# Patient Record
Sex: FEMALE | Race: WHITE
[De-identification: names, ages, dates, MRNs, and addresses within clinical notes are randomized per-mention and may not be internally consistent; named-entity substitution may affect disease eponyms.]

---

## 2017-07-12 ENCOUNTER — HOSPITAL ENCOUNTER (OUTPATIENT)
Dept: HOSPITAL 47 - ORWHC2ENDO | Age: 47
Discharge: HOME | End: 2017-07-12
Payer: COMMERCIAL

## 2017-07-12 VITALS — DIASTOLIC BLOOD PRESSURE: 88 MMHG | HEART RATE: 65 BPM | SYSTOLIC BLOOD PRESSURE: 131 MMHG

## 2017-07-12 VITALS — RESPIRATION RATE: 16 BRPM

## 2017-07-12 VITALS — TEMPERATURE: 98.7 F

## 2017-07-12 VITALS — BODY MASS INDEX: 20.6 KG/M2

## 2017-07-12 DIAGNOSIS — K64.2: ICD-10-CM

## 2017-07-12 DIAGNOSIS — K44.9: Primary | ICD-10-CM

## 2017-07-12 DIAGNOSIS — I10: ICD-10-CM

## 2017-07-12 DIAGNOSIS — F17.200: ICD-10-CM

## 2017-07-12 DIAGNOSIS — K64.8: ICD-10-CM

## 2017-07-12 DIAGNOSIS — I73.00: ICD-10-CM

## 2017-07-12 DIAGNOSIS — K21.0: ICD-10-CM

## 2017-07-12 DIAGNOSIS — K29.50: ICD-10-CM

## 2017-07-12 DIAGNOSIS — Z79.899: ICD-10-CM

## 2017-07-12 DIAGNOSIS — K63.5: ICD-10-CM

## 2017-07-12 DIAGNOSIS — B37.81: ICD-10-CM

## 2017-07-12 DIAGNOSIS — F41.9: ICD-10-CM

## 2017-07-12 DIAGNOSIS — J44.9: ICD-10-CM

## 2017-07-12 DIAGNOSIS — Z80.0: ICD-10-CM

## 2017-07-12 DIAGNOSIS — K57.30: ICD-10-CM

## 2017-07-12 DIAGNOSIS — F32.9: ICD-10-CM

## 2017-07-12 DIAGNOSIS — K22.10: ICD-10-CM

## 2017-07-12 DIAGNOSIS — Z79.51: ICD-10-CM

## 2017-07-12 PROCEDURE — 88342 IMHCHEM/IMCYTCHM 1ST ANTB: CPT

## 2017-07-12 PROCEDURE — 45380 COLONOSCOPY AND BIOPSY: CPT

## 2017-07-12 PROCEDURE — 43239 EGD BIOPSY SINGLE/MULTIPLE: CPT

## 2017-07-12 PROCEDURE — 88305 TISSUE EXAM BY PATHOLOGIST: CPT

## 2017-07-12 PROCEDURE — 45385 COLONOSCOPY W/LESION REMOVAL: CPT

## 2017-07-12 NOTE — P.PCN
Date of Procedure: 07/12/17


Preoperative Diagnosis: 





Postoperative Diagnosis: 





Procedure(s) Performed: 





Implants: 





Indications for Procedure: 





Operative Findings: 





Description of Procedure: 











PREOPERATIVE DIAGNOSIS:


Colonoscopy screening, initial.





POSTOPERATIVE DIAGNOSIS:


Colonoscopy screening, initial.


Diverticulosis, sigmoid colon.


Tubular adenomas.


External hemorrhoids.





OPERATION:


Colonoscopy to the ileocecal valve and appendiceal orifice.


Colonoscopy with cold forceps biopsies.


Colonoscopy with snare polypectomy.





SURGEON: Genia Garcia MD.





ANESTHESIA: MAC.





INDICATIONS:


The patient is a 46-year-old female who presents for colonoscopy screening.  

Benefits and risks were described and informed consent was obtained.





DESCRIPTION OF PROCEDURE:


The patient had undergone Gatorade, MiraLAX and Dulcolax prep. She had been 

brought into the operating room and laid in the left lateral decubitus 

position. After adequate intravenous sedation, the rectum was examined with 2% 

lidocaine jelly. External hemorrhoids were encountered. The rectal tone was 

within normal limits. No lesions were palpated in the rectal vault. An Olympus 

colonoscope was advanced until the ileocecal valve and appendiceal orifice were 

clearly viewed.  The prep was excellent with clear visualization of the mucosal 

folds.   The scope was removed with visualization of each mucosal fold.  

Scattered diverticulosis was encountered.  Tubular adenomas were found between 3

-4 mm treated with cold forceps biopsy as well as snare polypectomy at 30 cm 

and proximal including mid transverse colon. No evidence of focal colitis was 

found. Grade 3 internal hemorrhoids without active bleeding or inflammation 

were encountered. The colon was desufflated. The patient had tolerated the 

procedure well. 





Withdrawal time was over 6 minutes.





FINDINGS:


Internal hemorrhoids, grade 2.


External prolapsed hemorrhoids.


No arteriovenous malformations.


Tubular adenomas were found between 3-4 mm treated with cold forceps biopsy as 

well as snare polypectomy at 30 cm and proximal including mid transverse colon.


No focal colitis.





RECOMMENDATIONS:


Lower endoscopy in 3 years (2020) per screening guidelines. 





Plan - Discharge Summary


New Discharge Prescriptions: 


New


   Fluconazole 200 mg PO DAILY #14 tab





No Action


   traZODone  mg PO HS


   traMADol HCL [Ultram] 50 mg PO Q12HR PRN


     PRN Reason: Pain


   Cetirizine HCl [Zyrtec] 10 mg PO DAILY


   Omeprazole [PriLOSEC] 40 mg PO DAILY


   Albuterol Nebulized [Ventolin Nebulized] 2.5 mg INHALATION TID PRN


     PRN Reason: sob


   Citalopram Hydrobromide [CeleXA] 20 mg PO BID


   amLODIPine [Norvasc] 5 mg PO DAILY


   Naproxen [Naprosyn] 500 mg PO Q12HR PRN


     PRN Reason: Pain


   Tiotropium Bromide [Spiriva] 1 cap INHALATION DAILY


   Salmeterol Xinafoate [Serevent Diskus] 50 mcg IH BID MDD sob


   Cholecalciferol [Vitamin D3] 1,000 unit PO DAILY


   Beclomethasone Dipropionate [Qvar 80 mcg] 1 puff INHALATION BID


   Albuterol Inhaler [Ventolin Hfa Inhaler] 2 puff INHALATION Q6HR PRN


     PRN Reason: sob


   Potassium(Dose Unknown) 1 tab PO DAILY


   Nicotine Tube 4 mg IH AS DIRECTED PRN


     PRN Reason: Nicotine Cravings


   ALPRAZolam [Xanax] 0.5 mg PO BID PRN


     PRN Reason: Anxiety


Discharge Medication List





ALPRAZolam [Xanax] 0.5 mg PO BID PRN 07/10/17 [History]


Albuterol Inhaler [Ventolin Hfa Inhaler] 2 puff INHALATION Q6HR PRN 07/10/17 [

History]


Albuterol Nebulized [Ventolin Nebulized] 2.5 mg INHALATION TID PRN 07/10/17 [

History]


Beclomethasone Dipropionate [Qvar 80 mcg] 1 puff INHALATION BID 07/10/17 [

History]


Cetirizine HCl [Zyrtec] 10 mg PO DAILY 07/10/17 [History]


Cholecalciferol [Vitamin D3] 1,000 unit PO DAILY 07/10/17 [History]


Citalopram Hydrobromide [CeleXA] 20 mg PO BID 07/10/17 [History]


Naproxen [Naprosyn] 500 mg PO Q12HR PRN 07/10/17 [History]


Nicotine Tube 4 mg IH AS DIRECTED PRN 07/10/17 [History]


Omeprazole [PriLOSEC] 40 mg PO DAILY 07/10/17 [History]


Potassium(Dose Unknown) 1 tab PO DAILY 07/10/17 [History]


Salmeterol Xinafoate [Serevent Diskus] 50 mcg IH BID MDD sob 07/10/17 [History]


Tiotropium Bromide [Spiriva] 1 cap INHALATION DAILY 07/10/17 [History]


amLODIPine [Norvasc] 5 mg PO DAILY 07/10/17 [History]


traMADol HCL [Ultram] 50 mg PO Q12HR PRN 07/10/17 [History]


traZODone  mg PO HS 07/10/17 [History]


Fluconazole 200 mg PO DAILY #14 tab 07/12/17 [Rx]








Follow up Appointment(s)/Referral(s): 


Genia Garcia MD [STAFF PHYSICIAN] - 08/01/17


Patient Instructions/Handouts:  Colorectal Polyps (GEN), Diverticulosis Diet (

GEN), Diverticulosis (GEN), Hemorrhoids (GEN)


Activity/Diet/Wound Care/Special Instructions: 


Repeat colonoscopy 3 years, 2020.


Discharge Disposition: HOME SELF-CARE

## 2017-07-12 NOTE — P.PCN
Date of Procedure: 07/12/17


Preoperative Diagnosis: 





Postoperative Diagnosis: 





Procedure(s) Performed: 





Implants: 





Indications for Procedure: 





Operative Findings: 





Description of Procedure: 





PREOPERATIVE DIAGNOSIS:


Gastroesophageal reflux disease.


Epigastric abdominal pain.





POSTOPERATIVE DIAGNOSIS:


Esophagitis.


Gastritis.


Gastroesophageal reflux disease.


Epigastric abdominal pain.


Diaphragmatic hiatal hernia without obstruction.





OPERATION:


Esophagogastroduodenoscopy with biopsies along antrum and esophagus.





SURGEON: Genia Garcia MD





ANESTHESIA: MAC.





INDICATIONS:


The patient is a 46-year-old female who presents with a history of reflux 

disease and epigastric abdominal pain. Benefits and risks of the procedure were 

described. Informed consent was obtained.





DESCRIPTION:


The patient was brought into the endoscopy suite and laid in the left lateral 

decubitus position. An Olympus gastroscope was passed along the posterior 

oropharynx down to the distal esophagus where the squamocolumnar junction was 

encountered at 41 cm from the incisors. The stomach was entered and no bile 

reflux was found.  Additional findings are listed below. Biopsies with cold 

forceps were obtained of the antrum. The first through third portion of the 

duodenum was examined and unremarkable. Retroflexion of the scope confirmed  

Hill grade 3 lower esophageal valve. The squamocolumnar junction LA grade B 

erosive esophagitis.  Moderate yeast was found throughout the entire length of 

the esophagus.  The stomach was desufflated. The patient tolerated the 

procedure well.





FINDINGS:


Squamocolumnar junction 41 cm from the incisors.


Diaphragmatic hiatus at 41 cm.


Hill grade 3 lower esophageal valve.


LA grade B erosive esophagitis.


No active duodenitis.


Yeast esophagitis.


Chronic gastritis.





RECOMMENDATIONS:


Further recommendations pending results of pathology report.


Upper endoscopy as needed.

## 2017-07-12 NOTE — P.GSHP
History of Present Illness


H&P Date: 07/12/17





CHIEF COMPLAINT:


History of gas bloat and hiatal hernia. 





HISTORY OF PRESENT ILLNESS:


Zakia Schulte is a 46 year-old female who was last seen in 2015. She was 

actually set to get an upper and lower endoscopy, however she chickened out. 

She has a strong family history of colon cancer. She also reports gas bloat and 

constipation. She is actively smoking; however, she reports cutting down in 

moderate amounts. She also has Raynaud's in the fingertips as well secondary to 

poor circulation. 





ROS


Additionally reports: GI: Gastroesophageal reflux disease. Reports chronic 

constipation. 





CONSTITUTIONAL: No fevers or chills. Weight unchanged. 


HEENT: Denies any trouble with vision, hearing or nosebleeds. No difficulty 

swallowing. 


LYMPHATIC: The patient denies any lumps and bumps around the neck. 


ENDOCRINE: Denies any thyroid disorders. Denies any blood sugar glucose 

intolerance.


RESPIRATORY: Denies pneumonia. Has troubles with breathing or dyspnea on 

exertion. Asthma.


CARDIOVASCULAR: Denies any chest pain, palpitations, or recent heart attacks. 


GENITOURINARY: Denies any blood in urine or increased urinary frequency. 


MUSCULOSKELETAL: Denies any back pain, stiffness or joint arthritis. 


NEUROLOGIC: Denies any numbness or tingling along the distal extremities. No 

seizure disorders or headaches.


PSYCHIATRIC: Denies any depression or suicidal ideation.


HEMATOLOGIC: Denies any abnormal bleeding or bruising. 


BREASTS: Denies any breast lumps, pain or nipple discharge.





Physical Exam


Patient is a 46-year-old female.





GENERAL: Well developed and in no acute distress. Pleasant.


HEENT: No sclera icterus. Extraocular movements grossly intact. Moist buccal 

mucosa. Head is atraumatic, normocephalic. Hears conversational speech. No 

nasal drainage.


NECK: Supple without lymphadenopathy. No JV distention.


CHEST: Non-labored respirations and equal bilateral excursions. 


CARDIOVASCULAR: Regular rate and rhythm. Palpable 2+ radial pulses.


ABDOMEN: Soft, distended. Nontender.


MUSCULOSKELETAL: No clubbing, cyanosis or edema.


NEUROLOGIC: No focal or lateralizing signs. 


PSYCH: Appropriate affect. Alert and oriented to person, place and time.





Assessment / Plan


ASSESSMENT:


1. History of gas bloat. 


2. Hiatal hernia. 


3. Gastroesophageal reflux disease. 


4. Family history of colon cancer. 





PLAN:


1. I have recommended proceeding with an upper and lower endoscopy as she 

reports hiatal hernia and has family history of colon cancer, and changes in 

bowel habits. 


2. I did go over tobacco cessation and counseling, as she has poor circulation 

with Raynaud's. Her increased inhalation will also increase her gas bloat as 

well. 


3. I have recommended follow up upon completion of her studies. 








Past Medical History


Past Medical History: COPD, GERD/Reflux, Hypertension


Additional Past Medical History / Comment(s): "chronic pollution disease", 

hiatal hernia, raynauds syndrome,


History of Any Multi-Drug Resistant Organisms: None Reported


Past Surgical History: Orthopedic Surgery, Tubal Ligation


Additional Past Surgical History / Comment(s): floridalma and screws left leg


Past Anesthesia/Blood Transfusion Reactions: No Reported Reaction


Additional Past Anesthesia/Blood Transfusion Reaction / Comment(s): 

claustrophobia


Smoking Status: Current every day smoker





- Past Family History


  ** Father


Family Medical History: Cancer





Medications and Allergies


 Home Medications











 Medication  Instructions  Recorded  Confirmed  Type


 


ALPRAZolam [Xanax] 0.5 mg PO BID PRN 07/10/17 07/10/17 History


 


Albuterol Inhaler [Ventolin Hfa 2 puff INHALATION Q6HR PRN 07/10/17 07/10/17 

History





Inhaler]    


 


Albuterol Nebulized [Ventolin 2.5 mg INHALATION TID PRN 07/10/17 07/10/17 

History





Nebulized]    


 


Beclomethasone Dipropionate [Qvar 1 puff INHALATION BID 07/10/17 07/10/17 

History





80 mcg]    


 


Cetirizine HCl [Zyrtec] 10 mg PO DAILY 07/10/17 07/10/17 History


 


Cholecalciferol [Vitamin D3] 1,000 unit PO DAILY 07/10/17 07/10/17 History


 


Citalopram Hydrobromide [CeleXA] 20 mg PO BID 07/10/17 07/10/17 History


 


Naproxen [Naprosyn] 500 mg PO Q12HR PRN 07/10/17 07/10/17 History


 


Nicotine Tube 4 mg IH AS DIRECTED PRN 07/10/17 07/10/17 History


 


Omeprazole [PriLOSEC] 40 mg PO DAILY 07/10/17 07/10/17 History


 


Potassium(Dose Unknown) 1 tab PO DAILY 07/10/17 07/10/17 History


 


Salmeterol Xinafoate [Serevent 50 mcg IH BID MDD sob 07/10/17 07/10/17 History





Diskus]    


 


Tiotropium Bromide [Spiriva] 1 cap INHALATION DAILY 07/10/17 07/10/17 History


 


amLODIPine [Norvasc] 5 mg PO DAILY 07/10/17 07/10/17 History


 


traMADol HCL [Ultram] 50 mg PO Q12HR PRN 07/10/17 07/10/17 History


 


traZODone  mg PO HS 07/10/17 07/10/17 History











 Allergies











Allergy/AdvReac Type Severity Reaction Status Date / Time


 


No Known Allergies Allergy   Verified 07/10/17 13:54

## 2018-07-18 ENCOUNTER — HOSPITAL ENCOUNTER (INPATIENT)
Dept: HOSPITAL 47 - EC | Age: 48
LOS: 2 days | Discharge: HOME | DRG: 246 | End: 2018-07-20
Attending: HOSPITALIST | Admitting: HOSPITALIST
Payer: COMMERCIAL

## 2018-07-18 VITALS — BODY MASS INDEX: 21.2 KG/M2

## 2018-07-18 DIAGNOSIS — Z87.81: ICD-10-CM

## 2018-07-18 DIAGNOSIS — F40.240: ICD-10-CM

## 2018-07-18 DIAGNOSIS — Z79.02: ICD-10-CM

## 2018-07-18 DIAGNOSIS — I21.4: Primary | ICD-10-CM

## 2018-07-18 DIAGNOSIS — I11.0: ICD-10-CM

## 2018-07-18 DIAGNOSIS — Z79.51: ICD-10-CM

## 2018-07-18 DIAGNOSIS — K21.9: ICD-10-CM

## 2018-07-18 DIAGNOSIS — I50.21: ICD-10-CM

## 2018-07-18 DIAGNOSIS — J44.9: ICD-10-CM

## 2018-07-18 DIAGNOSIS — F17.200: ICD-10-CM

## 2018-07-18 DIAGNOSIS — F41.0: ICD-10-CM

## 2018-07-18 DIAGNOSIS — Z79.899: ICD-10-CM

## 2018-07-18 DIAGNOSIS — Z71.6: ICD-10-CM

## 2018-07-18 DIAGNOSIS — I25.5: ICD-10-CM

## 2018-07-18 DIAGNOSIS — Z95.5: ICD-10-CM

## 2018-07-18 DIAGNOSIS — E87.1: ICD-10-CM

## 2018-07-18 DIAGNOSIS — I08.1: ICD-10-CM

## 2018-07-18 DIAGNOSIS — I73.00: ICD-10-CM

## 2018-07-18 DIAGNOSIS — I95.9: ICD-10-CM

## 2018-07-18 DIAGNOSIS — Z79.82: ICD-10-CM

## 2018-07-18 LAB
ALBUMIN SERPL-MCNC: 4.9 G/DL (ref 3.5–5)
ALP SERPL-CCNC: 104 U/L (ref 38–126)
ALT SERPL-CCNC: 51 U/L (ref 9–52)
AMYLASE SERPL-CCNC: 51 U/L (ref 30–110)
ANION GAP SERPL CALC-SCNC: 15 MMOL/L
APTT BLD: 23.9 SEC (ref 22–30)
AST SERPL-CCNC: 65 U/L (ref 14–36)
BASOPHILS # BLD AUTO: 0 K/UL (ref 0–0.2)
BASOPHILS NFR BLD AUTO: 0 %
BUN SERPL-SCNC: 9 MG/DL (ref 7–17)
CALCIUM SPEC-MCNC: 10.4 MG/DL (ref 8.4–10.2)
CHLORIDE SERPL-SCNC: 87 MMOL/L (ref 98–107)
CK SERPL-CCNC: 249 U/L (ref 30–135)
CK SERPL-CCNC: 259 U/L (ref 30–135)
CK SERPL-CCNC: 277 U/L (ref 30–135)
CO2 SERPL-SCNC: 24 MMOL/L (ref 22–30)
EOSINOPHIL # BLD AUTO: 0.1 K/UL (ref 0–0.7)
EOSINOPHIL NFR BLD AUTO: 1 %
ERYTHROCYTE [DISTWIDTH] IN BLOOD BY AUTOMATED COUNT: 5.77 M/UL (ref 3.8–5.4)
ERYTHROCYTE [DISTWIDTH] IN BLOOD: 12.6 % (ref 11.5–15.5)
GLUCOSE SERPL-MCNC: 114 MG/DL (ref 74–99)
HCT VFR BLD AUTO: 54.3 % (ref 34–46)
HGB BLD-MCNC: 18.5 GM/DL (ref 11.4–16)
HYALINE CASTS UR QL AUTO: 1 /LPF (ref 0–2)
INR PPP: 1.1 (ref ?–1.2)
LIPASE SERPL-CCNC: 73 U/L (ref 23–300)
LYMPHOCYTES # SPEC AUTO: 1.2 K/UL (ref 1–4.8)
LYMPHOCYTES NFR SPEC AUTO: 12 %
MCH RBC QN AUTO: 32 PG (ref 25–35)
MCHC RBC AUTO-ENTMCNC: 34 G/DL (ref 31–37)
MCV RBC AUTO: 94.1 FL (ref 80–100)
MONOCYTES # BLD AUTO: 0.7 K/UL (ref 0–1)
MONOCYTES NFR BLD AUTO: 6 %
NEUTROPHILS # BLD AUTO: 8.5 K/UL (ref 1.3–7.7)
NEUTROPHILS NFR BLD AUTO: 80 %
PH UR: 7.5 [PH] (ref 5–8)
PLATELET # BLD AUTO: 243 K/UL (ref 150–450)
POTASSIUM SERPL-SCNC: 4.5 MMOL/L (ref 3.5–5.1)
PROT SERPL-MCNC: 8.1 G/DL (ref 6.3–8.2)
PROT UR QL: (no result)
PT BLD: 10.4 SEC (ref 9–12)
RBC UR QL: 1 /HPF (ref 0–5)
SODIUM SERPL-SCNC: 126 MMOL/L (ref 137–145)
SP GR UR: 1.01 (ref 1–1.03)
SQUAMOUS UR QL AUTO: 2 /HPF (ref 0–4)
TROPONIN I SERPL-MCNC: 2.31 NG/ML (ref 0–0.03)
TROPONIN I SERPL-MCNC: 3.21 NG/ML (ref 0–0.03)
TROPONIN I SERPL-MCNC: 3.39 NG/ML (ref 0–0.03)
UROBILINOGEN UR QL STRIP: 4 MG/DL (ref ?–2)
WBC # BLD AUTO: 10.6 K/UL (ref 3.8–10.6)
WBC #/AREA URNS HPF: 4 /HPF (ref 0–5)

## 2018-07-18 PROCEDURE — 74018 RADEX ABDOMEN 1 VIEW: CPT

## 2018-07-18 PROCEDURE — 96365 THER/PROPH/DIAG IV INF INIT: CPT

## 2018-07-18 PROCEDURE — 85730 THROMBOPLASTIN TIME PARTIAL: CPT

## 2018-07-18 PROCEDURE — 93005 ELECTROCARDIOGRAM TRACING: CPT

## 2018-07-18 PROCEDURE — 93306 TTE W/DOPPLER COMPLETE: CPT

## 2018-07-18 PROCEDURE — 83605 ASSAY OF LACTIC ACID: CPT

## 2018-07-18 PROCEDURE — 96368 THER/DIAG CONCURRENT INF: CPT

## 2018-07-18 PROCEDURE — 85610 PROTHROMBIN TIME: CPT

## 2018-07-18 PROCEDURE — 81001 URINALYSIS AUTO W/SCOPE: CPT

## 2018-07-18 PROCEDURE — 96375 TX/PRO/DX INJ NEW DRUG ADDON: CPT

## 2018-07-18 PROCEDURE — 4A023N7 MEASUREMENT OF CARDIAC SAMPLING AND PRESSURE, LEFT HEART, PERCUTANEOUS APPROACH: ICD-10-PCS

## 2018-07-18 PROCEDURE — 96376 TX/PRO/DX INJ SAME DRUG ADON: CPT

## 2018-07-18 PROCEDURE — 84484 ASSAY OF TROPONIN QUANT: CPT

## 2018-07-18 PROCEDURE — 96372 THER/PROPH/DIAG INJ SC/IM: CPT

## 2018-07-18 PROCEDURE — 83690 ASSAY OF LIPASE: CPT

## 2018-07-18 PROCEDURE — 85025 COMPLETE CBC W/AUTO DIFF WBC: CPT

## 2018-07-18 PROCEDURE — 82550 ASSAY OF CK (CPK): CPT

## 2018-07-18 PROCEDURE — 82150 ASSAY OF AMYLASE: CPT

## 2018-07-18 PROCEDURE — 96366 THER/PROPH/DIAG IV INF ADDON: CPT

## 2018-07-18 PROCEDURE — 82553 CREATINE MB FRACTION: CPT

## 2018-07-18 PROCEDURE — 80053 COMPREHEN METABOLIC PANEL: CPT

## 2018-07-18 PROCEDURE — 99291 CRITICAL CARE FIRST HOUR: CPT

## 2018-07-18 PROCEDURE — B2151ZZ FLUOROSCOPY OF LEFT HEART USING LOW OSMOLAR CONTRAST: ICD-10-PCS

## 2018-07-18 PROCEDURE — 027036Z DILATION OF CORONARY ARTERY, ONE ARTERY WITH THREE DRUG-ELUTING INTRALUMINAL DEVICES, PERCUTANEOUS APPROACH: ICD-10-PCS

## 2018-07-18 PROCEDURE — B2111ZZ FLUOROSCOPY OF MULTIPLE CORONARY ARTERIES USING LOW OSMOLAR CONTRAST: ICD-10-PCS

## 2018-07-18 PROCEDURE — 71046 X-RAY EXAM CHEST 2 VIEWS: CPT

## 2018-07-18 PROCEDURE — 93458 L HRT ARTERY/VENTRICLE ANGIO: CPT

## 2018-07-18 PROCEDURE — 96361 HYDRATE IV INFUSION ADD-ON: CPT

## 2018-07-18 PROCEDURE — 36415 COLL VENOUS BLD VENIPUNCTURE: CPT

## 2018-07-18 RX ADMIN — NITROGLYCERIN ONE MCG: 10 INJECTION INTRAVENOUS at 18:17

## 2018-07-18 RX ADMIN — CEFAZOLIN SCH MLS: 330 INJECTION, POWDER, FOR SOLUTION INTRAMUSCULAR; INTRAVENOUS at 17:20

## 2018-07-18 RX ADMIN — NITROGLYCERIN ONE MCG: 10 INJECTION INTRAVENOUS at 17:58

## 2018-07-18 RX ADMIN — METOPROLOL TARTRATE SCH MG: 25 TABLET, FILM COATED ORAL at 21:52

## 2018-07-18 RX ADMIN — CEFAZOLIN SCH MLS/HR: 330 INJECTION, POWDER, FOR SOLUTION INTRAMUSCULAR; INTRAVENOUS at 16:03

## 2018-07-18 RX ADMIN — ATORVASTATIN CALCIUM SCH MG: 80 TABLET, FILM COATED ORAL at 21:52

## 2018-07-18 RX ADMIN — NITROGLYCERIN ONE MCG: 10 INJECTION INTRAVENOUS at 18:05

## 2018-07-18 NOTE — XR
EXAMINATION TYPE: XR chest 2V

 

DATE OF EXAM: 7/18/2018

 

COMPARISON: NONE

 

HISTORY: Chest pain

 

TECHNIQUE:  Frontal and lateral views of the chest are obtained.

 

FINDINGS:  

 

There is no focal air space opacity.

 

No evidence for pneumothorax.  No pleural effusion.

 

The cardiac silhouette size is within normal limits.

 

The osseous structures are grossly intact.

 

IMPRESSION:  

 

1.  No acute cardiopulmonary process.

## 2018-07-18 NOTE — XR
EXAMINATION TYPE: XR KUB

 

DATE OF EXAM: 7/18/2018

 

COMPARISON: NONE

 

HISTORY: Pain

 

TECHNIQUE: Single supine KUB image of the abdomen is obtained

 

FINDINGS:  

Small bowel demonstrates no evidence for dilatation or air fluid levels.  

 

Gas and fecal material is seen in non-distended colon.  

 

No convincing evidence for pneumoperitoneum.

 

 No unusual calcifications. 

 

The lung bases are clear. 

 

The osseous structures are intact.

 

IMPRESSION:  

 

1.  Overall nonobstructive bowel gas pattern.

## 2018-07-18 NOTE — CONS
CONSULTATION



DATE OF SERVICE:

2018



Reason for the consultation is epigastric discomfort.



HISTORY OF PRESENT ILLNESS:

This is a pleasant 47-year-old  female patient with a past medical history

significant for hypertension, as well as chronic obstructive pulmonary disease who

presented to the emergency room earlier today complaining of epigastric discomfort.

The patient was in her usual state of health until yesterday when she started

experiencing discomfort mainly in the epigastric area with some radiation to the back.

The discomfort was a sharp kind of discomfort.  No associated symptoms of shortness of

breath, but she did have nausea associated with discomfort.  Yesterday she did not seek

any medical attention, but the discomfort today got worse and she decided to come to

the emergency room.  In the emergency room, the patient did have a cardiac workup.  The

EKG showed sinus rhythm with nonspecific changes in the inferior leads and T-wave

inversion in the inferolateral leads, as well as Q-wave inferiorly.  The cardiac

enzymes were checked and came in to be abnormal.  The patient was ruled out for acute

non-ST elevation myocardial infarction.  When the patient was seen in the emergency

room, she was complaining of very mild chest discomfort and she was on heparin IV.

Because of that, I did recommend proceeding with a heart catheterization.  The patient

did undergo a heart catheterization and that revealed occluded first obtuse marginal

branch of the left circumflex, which was working as ramus intermedius from the ostium.

She underwent successful stenting of the first obtuse marginal branch of the left

circumflex using three drug-eluting stent with good angiographic results and without

any complication from a procedure was performed from the right groin.  By the end of

the procedure, the patient was completely pain-free.



PAST MEDICAL HISTORY:

Includes:

1. Hypertension.

2. Chronic obstructive pulmonary disease.



PAST SURGICAL HISTORY:

There is no major cardiovascular surgery in the past.  She did undergo tubal ligation

in the past.



SOCIAL HISTORY:

The patient does smoke about 1 pack of cigarette every day for more than 20 years.  She

does not drink alcohol.



FAMILY HISTORY:

Family history is not relevant.



CURRENT MEDICATIONS:

At home include the followin. Bronchodilators inhalers.

2. Amlodipine 5 mg p.o. daily.

3. Omeprazole 40 mg p.o. daily.

4. Citalopram 20 mg daily.

5. Albuterol inhaler q.i.d.



PHYSICAL EXAMINATION:

GENERAL APPEARANCE:  The patient was not looking in any pain or any distress.

VITALS:  Showed temp of 97.9, heart rate 88, respiratory rate is 18 per minute, blood

pressure is 115/76 mmHg.  Cardiovascular examination shows regular rate and rhythm with

normal S1 and S2. Respiratory examination shows clear breathing sounds bilaterally.

Abdomen is soft and nontender.

EXTREMITY EXAMINATION: No pedal edema.

VASCULAR EXAMINATION:  There is no right radial pulse, but she does have good right

femoral pulse.



DIAGNOSTIC WORKUP:

The EKG was as described above.  It did show sinus rhythm with nonspecific changes

inferiorly and Q-wave inferiorly, as well as T-wave inversion in the inferolateral

leads.  WBC is 10.6, hemoglobin 18.5. Troponin is 2.310.  Sodium is 126, potassium is

4.5, BUN is 9, creatinine 0.67.



ASSESSMENT:

1. Acute coronary syndrome.

2. Status post percutaneous coronary intervention of the first obtuse marginal branch

    of the left circumflex.

3. Hypertension.

4. Significant history of smoking.



PLAN:

1. Dual anti-platelet therapy.

2. High-intensity statin.

3. Anti-ischemic medication including metoprolol.

4. An echocardiogram with Doppler to evaluate the left ventricular systolic function.

5. Smoking cessation was discussed with the patient.

6. Follow up with the patient.

Thank you for allowing us to participate in her care and we will continue following up

with her.





PARAS / NIDHIN: [id number] / Job#: [id number]
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
## 2018-07-18 NOTE — CC
CARDIAC CATHETERIZATION REPORT



DATE OF SERVICE:

07/18/2018



PERFORMING PHYSICIAN:

Last Mar MD, interventional cardiologist.



PROCEDURES PERFORMED:

1. Selective right and left coronary angiogram.

2. Left heart catheterization.

3. Successful stenting of obtuse marginal 1 of the left circumflex using proximally

    2.5 x 18 mm Xience SHWETA, in the mid portion 2.25 x 15 and 2.25 x 8 mm Xience SHWETA

    with good angiographic results and reduction of stenosis from 100% to 0%.

4.



INDICATION:

This is a pleasant 47-year-old  female patient with history of hypertension

and significant history of smoking, who presented to the emergency room complaining of

epigastric discomfort felt in the beginning to be related to reflux disease.  The

patient was ruled in for acute non-ST elevation myocardial infarction.  The decision

was made toward heart catheterization and percutaneous coronary intervention.



APPROACH:

Right common femoral artery.



COMPLICATION:

None.



LEVEL OF SEDATION:

Moderate with sedation length of 56 minutes.



PROCEDURE DESCRIPTION:

After obtaining an informed consent, the patient was brought to cardiac cath lab.  The

right common femoral artery was cannulated using micropuncture technique and a

micropuncture wire passed easily.  Then I placed a 6-Welsh sheath in the right common

femoral artery.



I did after that selective right and left coronary angiogram using JR4 and JL4

catheters.  Left heart catheterization was performed using the JR4 catheter which

flipped into the LV, then I did pullback across the aortic valve.  The diagnostic

procedure was completed at that point.



After that, I did intervene on the left circumflex.  Please see a separate paragraph

for that.



SELECTIVE CORONARY ANGIOGRAM:

1. The RCA is a large caliber vessel and it is a dominant vessel.  The RCA has mild

    disease only.  Distally bifurcates into PDA and PLV branches.  Both are

    angiographically normal.

2. The left main is angiographically normal.  It bifurcates into left circumflex and

    left anterior descending artery.

3. The left circumflex is a large caliber vessel.  It is a nondominant vessel.  The

    proximal circ gives rise into a very high takeoff 1st OM branch which is occluded

    by the ostium.  The left circumflex in the mid and distal portion appeared to have

    mild disease only.

4. The LAD:  The LAD has mild disease only.



HEMODYNAMICS:

The left ventricular end-diastolic pressure was about 12 mmHg and no gradient was

identified across aortic valve. percutaneous intervention of the left.



CIRCUMFLEX/PERCUTANEOUS INTERVENTION OF THE OBTUSE MARGINAL 1:

Anticoagulation was initiated using Angiomax.  Subsequently I did take JL4 guide and

the left main was engaged.  I did cross acute total occlusion at the ostial of OM1

using a Whisper wire and I did advance the wire all the way to the distal OM1.  I did

initially do balloon angioplasty using 1.5 mm balloon.  After that, I did wire the OM1

using another wire with a run-through wire as a noé wire to straighten the tortuosity

in the OM1.  After that, I did deploy in the ostial/proximal OM1 2.5 x 18 mm Xience

SHWETA, where the stent was positioned under fluoroscopy guidance and deployed under its

nominal pressure.



There was another lesion in the mid portion of OM1, which seems to be tight, and I

decided to cover that lesion with a stent, so I took 2.25 x 15 mm Xience SHWETA and I did

position the stent under fluoroscopy guidance and deployed under its nominal pressure.

There was an area left between the 2 stents in the proximal and mid and it was quite

concerning and I decided to cover that lesion using 8 mm stent, so I took  2.25 x 8 mm

Xience and the stent was positioned under fluoroscopy guidance and deployed under its

nominal pressure.



The final angiogram showed good angiographic results with reduction of stenosis from

100% to 0% with MELODIE-3 flow.



CONCLUSION:

1. Acute coronary syndrome.

2. Acute/subacute total occlusion of OM1 off the left circumflex.

3. Successful stenting of obtuse marginal 1 of left circumflex using 3 drug-eluting

    stent with good angiographic results and without any complication.



POSTPROCEDURE MANAGEMENT:

1. Dual anti-platelet therapy.

2. Risk factor modifications.

3. Follow up with the patient.





MMODL / IJN: [id number] / Job#: [id number]

## 2018-07-19 LAB
ALBUMIN SERPL-MCNC: 3.4 G/DL (ref 3.5–5)
ALP SERPL-CCNC: 63 U/L (ref 38–126)
ALT SERPL-CCNC: 40 U/L (ref 9–52)
ANION GAP SERPL CALC-SCNC: 6 MMOL/L
AST SERPL-CCNC: 47 U/L (ref 14–36)
BASOPHILS # BLD AUTO: 0.1 K/UL (ref 0–0.2)
BASOPHILS NFR BLD AUTO: 1 %
BUN SERPL-SCNC: 12 MG/DL (ref 7–17)
CALCIUM SPEC-MCNC: 9.1 MG/DL (ref 8.4–10.2)
CHLORIDE SERPL-SCNC: 100 MMOL/L (ref 98–107)
CO2 SERPL-SCNC: 27 MMOL/L (ref 22–30)
EOSINOPHIL # BLD AUTO: 0.1 K/UL (ref 0–0.7)
EOSINOPHIL NFR BLD AUTO: 1 %
ERYTHROCYTE [DISTWIDTH] IN BLOOD BY AUTOMATED COUNT: 4.85 M/UL (ref 3.8–5.4)
ERYTHROCYTE [DISTWIDTH] IN BLOOD: 12.6 % (ref 11.5–15.5)
GLUCOSE SERPL-MCNC: 85 MG/DL (ref 74–99)
HCT VFR BLD AUTO: 46.8 % (ref 34–46)
HGB BLD-MCNC: 15.7 GM/DL (ref 11.4–16)
LYMPHOCYTES # SPEC AUTO: 2 K/UL (ref 1–4.8)
LYMPHOCYTES NFR SPEC AUTO: 19 %
MCH RBC QN AUTO: 32.4 PG (ref 25–35)
MCHC RBC AUTO-ENTMCNC: 33.6 G/DL (ref 31–37)
MCV RBC AUTO: 96.5 FL (ref 80–100)
MONOCYTES # BLD AUTO: 0.7 K/UL (ref 0–1)
MONOCYTES NFR BLD AUTO: 7 %
NEUTROPHILS # BLD AUTO: 7.4 K/UL (ref 1.3–7.7)
NEUTROPHILS NFR BLD AUTO: 71 %
PLATELET # BLD AUTO: 198 K/UL (ref 150–450)
POTASSIUM SERPL-SCNC: 4.1 MMOL/L (ref 3.5–5.1)
PROT SERPL-MCNC: 5.9 G/DL (ref 6.3–8.2)
SODIUM SERPL-SCNC: 133 MMOL/L (ref 137–145)
WBC # BLD AUTO: 10.4 K/UL (ref 3.8–10.6)

## 2018-07-19 RX ADMIN — METOPROLOL TARTRATE SCH MG: 25 TABLET, FILM COATED ORAL at 08:45

## 2018-07-19 RX ADMIN — Medication SCH: at 06:50

## 2018-07-19 RX ADMIN — Medication SCH MG: at 16:18

## 2018-07-19 RX ADMIN — ASPIRIN 325 MG ORAL TABLET SCH MG: 325 PILL ORAL at 08:45

## 2018-07-19 RX ADMIN — Medication SCH MG: at 13:29

## 2018-07-19 RX ADMIN — METOPROLOL TARTRATE SCH MG: 25 TABLET, FILM COATED ORAL at 20:04

## 2018-07-19 RX ADMIN — ATORVASTATIN CALCIUM SCH MG: 80 TABLET, FILM COATED ORAL at 20:04

## 2018-07-19 RX ADMIN — LISINOPRIL SCH MG: 2.5 TABLET ORAL at 13:29
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
## 2018-07-19 NOTE — ECHOF
Referral Reason:nstemi



MEASUREMENTS

--------

HEIGHT: 165.1 cm

WEIGHT: 59.0 kg

BP: 85/61

IVSd:   1.3 cm     (0.6 - 1.1)

LVIDd:   3.5 cm     (3.9 - 5.3)

LVPWd:   1.2 cm     (0.6 - 1.1)

IVSs:   1.6 cm

LVIDs:   2.8 cm

LVPWs:   1.3 cm

LA Diam:   2.0 cm     (2.7 - 3.8)

Ao Diam:   3.4 cm     (2.0 - 3.7)

AV Cusp:   2.1 cm     (1.5 - 2.6)

LA Diam:   2.5 cm     (2.7 - 3.8)

MV EXCURSION:   16.095 mm     (> 18.000)

MV EF SLOPE:   63 mm/s     (70 - 150)

EPSS:   0.2 cm

MV E Orville:   0.38 m/s

MV DecT:   198 ms

MV A Orville:   0.48 m/s

MV E/A Ratio:   0.78 

RAP:   5.00 mmHg

RVSP:   12.99 mmHg







FINDINGS

--------

Sinus rhythm.

This was a techncally difficult study with suboptimal views, , Lumason utilized for enhancement of im
ages.

The left ventricular size is normal.   There is mild concentric left ventricular hypertrophy.   Overa
ll left ventricular systolic function is mild-moderately impaired with, an EF between 40 - 45 %.   In
ferior Hypokinesis   Posterior Hypokinesis.

The right ventricle is normal in size.

The left atrial size is normal.

The right atrial size is normal.

5.0mg OF Lumason UTLIZED: 2 OR MORE WALL SEGMENTS NOT VISUALIZED.

The aortic valve is trileaflet, and appears structurally normal. No aortic stenosis or regurgitation.


Mild mitral annular calcification present.   Mild mitral regurgitation is present.

Mild tricuspid regurgitation present.   There is no evidence of pulmonary hypertension.   The right v
entricular systolic pressure, as measured by Doppler, is 12.99mmHg.

There is no pulmonic regurgitation present.

The aortic root size is normal.

There is no pericardial effusion.



CONCLUSIONS

--------

1. This was a techncally difficult study with suboptimal views, , Lumason utilized for enhancement of
 images.

2. The left ventricular size is normal.

3. There is mild concentric left ventricular hypertrophy.

4. Overall left ventricular systolic function is mild-moderately impaired with, an EF between 40 - 45
 %.

5. Inferior Hypokinesis

6. Posterior Hypokinesis.

7. The right ventricle is normal in size.

8. The left atrial size is normal.

9. The right atrial size is normal.

10. 5.0mg OF Lumason UTLIZED: 2 OR MORE WALL SEGMENTS NOT VISUALIZED.

11. The aortic valve is trileaflet, and appears structurally normal. No aortic stenosis or regurgitat
ion.

12. Mild mitral annular calcification present.

13. Mild mitral regurgitation is present.

14. Mild tricuspid regurgitation present.

15. There is no evidence of pulmonary hypertension.

16. The right ventricular systolic pressure, as measured by Doppler, is 12.99mmHg.

17. There is no pulmonic regurgitation present.

18. The aortic root size is normal.

19. There is no pericardial effusion.





SONOGRAPHER: Argentina Elliott RDCS

## 2018-07-19 NOTE — P.PN
Subjective


Progress Note Date: 07/19/18


Principal diagnosis: 


NSTEMI





Summary pleasant 47-year-old  female patient with past medical history 

significant for hypertension, COPD and smoking.  Presented to the emergency 

department complaining of epigastric discomfort that started the day prior.  

EKG showed sinus rhythm with nonspecific changes in the inferior leads and T-

wave inversion in the inferolateral leads as well as Q waves inferiorly.  

Cardiac enzymes came back to be elevated with a troponin of 2.310, 3.210 and 

3.390.  She was recommended to undergo cardiac catheterization which revealed 

occluded first obtuse marginal branch of the left circumflex for which she 

underwent successful stenting of the first obtuse marginal branch of left 

circumflex using 3 drug-eluting stents.  He underwent echocardiogram with 

Doppler study which revealed mild to moderately impaired LV systolic function 

with an ejection fraction between 40-45%, inferior and posterior hypokinesis as 

well as mild mitral regurgitation and mild tricuspid regurgitation.  She is 

currently on aspirin 325 mg by mouth daily, Lipitor 80 mg by mouth daily at 

bedtime, Plavix 75 mg by mouth daily and metoprolol tartrate 25 mg by mouth 

twice a day.  Her blood pressure is running on the low side with readings of 85/

61, 106/67 and 93/68.  Her heart rate is in the 70s.  Upon examination, patient 

is resting comfortably in bed.  She denies complaints of chest or epigastric 

discomfort.  She's only been up to the bathroom but has done this without 

difficulties.  She is anxious to be discharged home.








Objective





- Vital Signs


Vital signs: 


 Vital Signs











Temp  96.6 F L  07/19/18 08:48


 


Pulse  76   07/19/18 08:49


 


Resp  16   07/19/18 08:49


 


BP  93/68   07/19/18 08:48


 


Pulse Ox  94 L  07/19/18 08:48








 Intake & Output











 07/18/18 07/19/18 07/19/18





 18:59 06:59 18:59


 


Intake Total 166 250 120


 


Output Total 600  


 


Balance -434 250 120


 


Weight 58.967 kg 57.7 kg 57.7 kg


 


Intake:   


 


    


 


  Intake, IV Titration 50 250 





  Amount   


 


    Bivalirudin 250 mg In 50  





    Sodium Chloride 0.9% 50   





    ml @ 0 mls/hr IV .[facility]   





    ONE Rx#:[id number]   


 


    Sodium Chloride 0.9% 1,  250 





    000 ml @ 100 mls/hr IV .   





    Q10H NGUYEN Rx#:[id number]   


 


  Oral   120


 


Output:   


 


  Urine 600  


 


Other:   


 


  Voiding Method   Toilet


 


  # Voids 1 1 














- Exam


PHYSICAL EXAMINATION: 





HEENT: Head is atraumatic, normocephalic.  Pupils equal, round.  Neck is 

supple.  There is no elevated jugular venous pressure.





HEART EXAMINATION: Heart sounds regular, S1 and S2 normal.  No murmur or gallop 

heard.





CHEST EXAMINATION: Lungs reveal diminished air entry bilaterally. No chest wall 

tenderness is noted on palpation or with deep breathing.





ABDOMEN:  Soft, nontender. Bowel sounds are heard. No organomegaly noted.


 


EXTREMITIES: Diminished peripheral pulses with no evidence of peripheral edema 

and no calf tenderness noted.  Right femoral puncture site soft with ecchymosis 

but no signs of hematoma.





NEUROLOGIC patient is awake, alert and oriented x3.


 


.


 











- Labs


CBC & Chem 7: 


 07/19/18 05:44





 07/19/18 05:44


Labs: 


 Abnormal Lab Results - Last 24 Hours (Table)











  07/18/18 07/18/18 07/18/18 Range/Units





  10:58 10:58 10:58 


 


RBC    5.77 H  (3.80-5.40)  m/uL


 


Hgb    18.5 H  (11.4-16.0)  gm/dL


 


Hct    54.3 H  (34.0-46.0)  %


 


Neutrophils #    8.5 H  (1.3-7.7)  k/uL


 


Sodium  126 L    (137-145)  mmol/L


 


Chloride  87 L    ()  mmol/L


 


Glucose  114 H    (74-99)  mg/dL


 


Calcium  10.4 H    (8.4-10.2)  mg/dL


 


Total Bilirubin  1.6 H    (0.2-1.3)  mg/dL


 


AST  65 H    (14-36)  U/L


 


Total Creatine Kinase   249 H   ()  U/L


 


CK-MB (CK-2)   12.4 H*   (0.0-2.4)  ng/mL


 


Troponin I   2.310 H*   (0.000-0.034)  ng/mL


 


Total Protein     (6.3-8.2)  g/dL


 


Albumin     (3.5-5.0)  g/dL


 


Urine Appearance     (Clear)  


 


Urine Protein     (Negative)  


 


Ur Leukocyte Esterase     (Negative)  


 


Urine Mucus     (None)  /hpf














  07/18/18 07/18/18 07/18/18 Range/Units





  11:59 19:43 22:16 


 


RBC     (3.80-5.40)  m/uL


 


Hgb     (11.4-16.0)  gm/dL


 


Hct     (34.0-46.0)  %


 


Neutrophils #     (1.3-7.7)  k/uL


 


Sodium     (137-145)  mmol/L


 


Chloride     ()  mmol/L


 


Glucose     (74-99)  mg/dL


 


Calcium     (8.4-10.2)  mg/dL


 


Total Bilirubin     (0.2-1.3)  mg/dL


 


AST     (14-36)  U/L


 


Total Creatine Kinase   277 H  259 H  ()  U/L


 


CK-MB (CK-2)   11.5 H*  12.0 H*  (0.0-2.4)  ng/mL


 


Troponin I   3.210 H*  3.390 H*  (0.000-0.034)  ng/mL


 


Total Protein     (6.3-8.2)  g/dL


 


Albumin     (3.5-5.0)  g/dL


 


Urine Appearance  Cloudy H    (Clear)  


 


Urine Protein  2+ H    (Negative)  


 


Ur Leukocyte Esterase  Small H    (Negative)  


 


Urine Mucus  Rare H    (None)  /hpf














  07/19/18 07/19/18 Range/Units





  05:44 05:44 


 


RBC    (3.80-5.40)  m/uL


 


Hgb    (11.4-16.0)  gm/dL


 


Hct  46.8 H   (34.0-46.0)  %


 


Neutrophils #    (1.3-7.7)  k/uL


 


Sodium   133 L  (137-145)  mmol/L


 


Chloride    ()  mmol/L


 


Glucose    (74-99)  mg/dL


 


Calcium    (8.4-10.2)  mg/dL


 


Total Bilirubin    (0.2-1.3)  mg/dL


 


AST   47 H  (14-36)  U/L


 


Total Creatine Kinase    ()  U/L


 


CK-MB (CK-2)    (0.0-2.4)  ng/mL


 


Troponin I    (0.000-0.034)  ng/mL


 


Total Protein   5.9 L  (6.3-8.2)  g/dL


 


Albumin   3.4 L  (3.5-5.0)  g/dL


 


Urine Appearance    (Clear)  


 


Urine Protein    (Negative)  


 


Ur Leukocyte Esterase    (Negative)  


 


Urine Mucus    (None)  /hpf














Assessment and Plan


Assessment: 


#1 non-ST elevation MI


#2 status post percutaneous coronary intervention of the first obtuse marginal 

branch of the left circumflex


#3 hypertension


#4 significant smoking history


#5 ischemic cardiomyopathy with an ejection fraction of 40-45% and inferior 

hypokinesis as well as posterior hypokinesis





Plan: 


From cardiology's perspective, continue dual antiplatelet therapy and high 

intensity statin.  Continue beta blocker and we will add ACE inhibitor.  

Continue to monitor the patient provide further recommendations accordingly.





NP note has been reviewed, I agree with a documented findings and plan of care.

  Patient was seen and examined.

## 2018-07-20 VITALS — RESPIRATION RATE: 17 BRPM | DIASTOLIC BLOOD PRESSURE: 62 MMHG | SYSTOLIC BLOOD PRESSURE: 102 MMHG | TEMPERATURE: 97.6 F

## 2018-07-20 VITALS — HEART RATE: 58 BPM

## 2018-07-20 LAB
BASOPHILS # BLD AUTO: 0.1 K/UL (ref 0–0.2)
BASOPHILS NFR BLD AUTO: 1 %
EOSINOPHIL # BLD AUTO: 0.1 K/UL (ref 0–0.7)
EOSINOPHIL NFR BLD AUTO: 2 %
ERYTHROCYTE [DISTWIDTH] IN BLOOD BY AUTOMATED COUNT: 4.63 M/UL (ref 3.8–5.4)
ERYTHROCYTE [DISTWIDTH] IN BLOOD: 12.6 % (ref 11.5–15.5)
HCT VFR BLD AUTO: 44.9 % (ref 34–46)
HGB BLD-MCNC: 15 GM/DL (ref 11.4–16)
LYMPHOCYTES # SPEC AUTO: 2.2 K/UL (ref 1–4.8)
LYMPHOCYTES NFR SPEC AUTO: 30 %
MCH RBC QN AUTO: 32.3 PG (ref 25–35)
MCHC RBC AUTO-ENTMCNC: 33.4 G/DL (ref 31–37)
MCV RBC AUTO: 96.9 FL (ref 80–100)
MONOCYTES # BLD AUTO: 0.6 K/UL (ref 0–1)
MONOCYTES NFR BLD AUTO: 8 %
NEUTROPHILS # BLD AUTO: 4.3 K/UL (ref 1.3–7.7)
NEUTROPHILS NFR BLD AUTO: 58 %
PLATELET # BLD AUTO: 205 K/UL (ref 150–450)
WBC # BLD AUTO: 7.5 K/UL (ref 3.8–10.6)

## 2018-07-20 RX ADMIN — LISINOPRIL SCH MG: 2.5 TABLET ORAL at 09:53

## 2018-07-20 RX ADMIN — METOPROLOL TARTRATE SCH MG: 25 TABLET, FILM COATED ORAL at 09:53

## 2018-07-20 RX ADMIN — ASPIRIN 325 MG ORAL TABLET SCH MG: 325 PILL ORAL at 09:53

## 2018-07-20 RX ADMIN — Medication SCH MG: at 09:54

## 2018-07-20 NOTE — P.PN
Subjective


Progress Note Date: 07/20/18





This is a pleasant 47-year-old  female patient with past medical 

history significant for hypertension, COPD and smoking.  Presented to the 

emergency department complaining of epigastric discomfort that started the day 

prior.  EKG showed sinus rhythm with nonspecific changes in the inferior leads 

and T-wave inversion in the inferolateral leads as well as Q waves inferiorly.  

Cardiac enzymes came back to be elevated with a troponin of 2.310, 3.210 and 

3.390.  She was recommended to undergo cardiac catheterization which revealed 

occluded first obtuse marginal branch of the left circumflex for which she 

underwent successful stenting of the first obtuse marginal branch of left 

circumflex using 3 drug-eluting stents. She underwent echocardiogram with 

Doppler study which revealed mild to moderately impaired LV systolic function 

with an ejection fraction between 40-45%, inferior and posterior hypokinesis as 

well as mild mitral regurgitation and mild tricuspid regurgitation.  She is 

currently on aspirin 325 mg by mouth daily, Lipitor 80 mg by mouth daily at 

bedtime, Plavix 75 mg by mouth daily and metoprolol tartrate 25 mg by mouth 

twice a day.  Blood pressure 102/60, heart rate in the 50s.  White blood cell 

count 7.5, hemoglobin 15.0, platelet count 205.





Objective





- Vital Signs


Vital signs: 


 Vital Signs











Temp  97.6 F   07/20/18 09:00


 


Pulse  58 L  07/20/18 09:00


 


Resp  17   07/20/18 09:00


 


BP  102/62   07/20/18 09:00


 


Pulse Ox  97   07/20/18 09:00








 Intake & Output











 07/19/18 07/20/18 07/20/18





 18:59 06:59 18:59


 


Intake Total 825  560


 


Output Total 350  


 


Balance 475  560


 


Weight 57.7 kg 58.7 kg 


 


Intake:   


 


  Intake, IV Titration 225  





  Amount   


 


    Sodium Chloride 0.9% 1, 225  





    000 ml @ 75 mls/hr IV .   





    [id number] [facility] Rx#:[id number]   


 


  Oral 600  560


 


Output:   


 


  Urine 350  


 


Other:   


 


  Voiding Method Toilet Toilet Toilet


 


  # Voids 1 1 


 


  # Bowel Movements 1  














- Exam





PHYSICAL EXAMINATION: 





HEENT: Head is atraumatic, normocephalic.  Pupils equal, round.  Neck is 

supple.  There is no elevated jugular venous pressure.





HEART EXAMINATION: Heart sounds regular, S1 and S2 normal.  No murmur or gallop 

heard.





CHEST EXAMINATION: Lungs reveal diminished air entry bilaterally. No chest wall 

tenderness is noted on palpation or with deep breathing.





ABDOMEN:  Soft, nontender. Bowel sounds are heard. No organomegaly noted.


 


EXTREMITIES: Diminished peripheral pulses with no evidence of peripheral edema 

and no calf tenderness noted.  Right femoral puncture site soft with ecchymosis 

but no signs of hematoma.





NEUROLOGIC patient is awake, alert and oriented x3.


 





- Labs


CBC & Chem 7: 


 07/20/18 06:02





 07/19/18 05:44





Assessment and Plan


Plan: 





Assessment: 


#1 non-ST elevation MI


#2 status post percutaneous coronary intervention of the first obtuse marginal 

branch of the left circumflex


#3 hypertension


#4 significant smoking history


#5 ischemic cardiomyopathy with an ejection fraction of 40-45% and inferior 

hypokinesis as well as posterior hypokinesis








Plan


Cardiology's perspective, she may be able to be discharged home today.  We will 

make her a follow-up appointment in the office one week postdischarge.  We will 

continue aspirin 81 mg daily, Plavix 75 mg daily, Lipitor 80 mg daily, Zestril 2

-1/2 mg daily, metoprolol 25 mg by mouth twice a day and sublingual 

nitroglycerin as needed for chest pain.








DNP note has been reviewed, I agree with a documented findings and plan of 

care.  Patient was seen and examined.

## 2018-07-20 NOTE — P.DS
Providers


Date of admission: 


07/18/18 13:32





Attending physician: 


Paul Jimenes





Consults: 





 





07/18/18 13:33


Consult Physician Urgent 


   Consulting Provider: Last Mar


   Consult Reason/Comments: NSTEMI


   Do you want consulting provider notified?: Yes





07/18/18 18:49


Consult Physician Routine 


   Consulting Provider: [facility]


   Consult Reason/Comments: Post Interventional patient


   Do you want consulting provider notified?: Already Contacted











Primary care physician: 


[facility] Course: 





patient is found to have non-ST elevation myocardial infarction, underwent 

cardiac catheterization and stenting of circumflex.  Patient had mild depressed 

ejection fraction echocardiogram that wasn't post cardiac catheterization with 

40-40% ejection fraction probably acute systolic dysfunction from a myocardial 

stunning.  Patient is presently doing well mildly hypotensive amlodipine will 

be discontinued.  Patient was started on ACE inhibitor continue with beta 

blocker and statin dual antiplatelet therapy at this time.





07/20/2018  patient  is clinically doing well overnight events





PHYSICAL EXAMINATION: 





GENERAL: The patient is alert and oriented x3, not in any acute distress. Well 

developed, well nourished. 


HEENT: Pupils are round and equally reacting to light. EOMI. No scleral 

icterus. No conjunctival pallor. Normocephalic, atraumatic. No pharyngeal 

erythema. No thyromegaly. 


CARDIOVASCULAR: S1 and S2 present. No murmurs, rubs, or gallops. 


PULMONARY: Chest is clear to auscultation, no wheezing or crackles. 


ABDOMEN: Soft, nontender, nondistended, normoactive bowel sounds. No palpable 

organomegaly. 


MUSCULOSKELETAL: No joint swelling or deformity.


EXTREMITIES: No cyanosis, clubbing, or pedal edema. 


NEUROLOGICAL: Gross neurological examination did not reveal any focal deficits. 


SKIN: No rashes. 





Assessment and Plan


Plan: 


-Acute non-ST elevation microinfarction patient is status post cardiac 

catheterization and stenting of obtuse marginal branch of left circumflex


-Hypertension


-Acute systolic dysfunction from myocardial stunning, ischemic cardiomyopathy 

from myocardial infarction the patient is euvolemic without any volume overload 

at this time


-Hypertension patient is actually hypotensive now amlodipine was discontinued


-Nicotine abuse history: Counseling was provided











Patient Condition at Discharge: Stable





Plan - Discharge Summary


Discharge Rx Participant: Yes


New Discharge Prescriptions: 


New


   Aspirin 325 mg PO DAILY #30 tab


   Atorvastatin [Lipitor] 80 mg PO HS #30 tab


   Clopidogrel [Plavix] 75 mg PO DAILY #30 tab


   Lisinopril [Zestril] 2.5 mg PO DAILY #30 tab


   Metoprolol Tartrate [Lopressor] 25 mg PO BID #60 tab


   Nitroglycerin Sl Tabs [Nitrostat] 0.4 mg SUBLINGUAL Q5M PRN #25 tab


     PRN Reason: Chest Pain





Discontinued


   amLODIPine [Norvasc] 5 mg PO DAILY





No Action


   Cetirizine HCl [Zyrtec] 10 mg PO DAILY


   Omeprazole [PriLOSEC] 40 mg PO DAILY


   Albuterol Nebulized [Ventolin Nebulized] 2.5 mg INHALATION RT-QID PRN


     PRN Reason: sob


   Citalopram Hydrobromide [CeleXA] 20 mg PO DAILY


   Beclomethasone Dipropionate [Qvar 80 mcg] 1 puff INHALATION RT-BID


   Albuterol Inhaler [Ventolin Hfa Inhaler] 2 puff INHALATION RT-Q4H PRN


     PRN Reason: Shortness Of Breath


   Olodaterol HCl [Striverdi Respimat] 2 spray INHALATION RT-DAILY


   Fluticasone Nasal Spray [Flonase Nasal Spray] 1 spray EA NOSTRIL DAILY


   Cyclobenzaprine [Flexeril] 10 mg PO BID


   Cholecalciferol [Vitamin D3] 1,000 unit PO DAILY


Discharge Medication List





Albuterol Inhaler [Ventolin Hfa Inhaler] 2 puff INHALATION RT-Q4H PRN 07/10/17 [

History]


Albuterol Nebulized [Ventolin Nebulized] 2.5 mg INHALATION RT-QID PRN 07/10/17 [

History]


Beclomethasone Dipropionate [Qvar 80 mcg] 1 puff INHALATION RT-BID 07/10/17 [

History]


Cetirizine HCl [Zyrtec] 10 mg PO DAILY 07/10/17 [History]


Citalopram Hydrobromide [CeleXA] 20 mg PO DAILY 07/10/17 [History]


Omeprazole [PriLOSEC] 40 mg PO DAILY 07/10/17 [History]


Cholecalciferol [Vitamin D3] 1,000 unit PO DAILY 07/18/18 [History]


Cyclobenzaprine [Flexeril] 10 mg PO BID 07/18/18 [History]


Fluticasone Nasal Spray [Flonase Nasal Spray] 1 spray EA NOSTRIL DAILY 07/18/18 

[History]


Olodaterol HCl [Striverdi Respimat] 2 spray INHALATION RT-DAILY 07/18/18 [

History]


Aspirin 325 mg PO DAILY #30 tab 07/20/18 [Rx]


Atorvastatin [Lipitor] 80 mg PO HS #30 tab 07/20/18 [Rx]


Clopidogrel [Plavix] 75 mg PO DAILY #30 tab 07/20/18 [Rx]


Lisinopril [Zestril] 2.5 mg PO DAILY #30 tab 07/20/18 [Rx]


Metoprolol Tartrate [Lopressor] 25 mg PO BID #60 tab 07/20/18 [Rx]


Nitroglycerin Sl Tabs [Nitrostat] 0.4 mg SUBLINGUAL Q5M PRN #25 tab 07/20/18 [Rx

]








Follow up Appointment(s)/Referral(s): 


Last Mar MD [STAFF PHYSICIAN] - 07/30/18 3:00 pm (MONDAY)


Howard Cantu MD [Primary Care Provider] - 07/26/18 11:30 am (THURSDAY)


Patient Instructions/Handouts:  *[facility] - After Heart Catheterization - 

Ambulatory Care Instructions, Left Heart Catheterization (DC), Heart Healthy 

Diet (DC)


Discharge Disposition: HOME SELF-CARE

## 2022-03-22 ENCOUNTER — HOSPITAL ENCOUNTER (OUTPATIENT)
Dept: HOSPITAL 47 - RADNMMAIN | Age: 52
Discharge: HOME | End: 2022-03-22
Attending: INTERNAL MEDICINE
Payer: COMMERCIAL

## 2022-03-22 DIAGNOSIS — I10: Primary | ICD-10-CM

## 2022-03-22 PROCEDURE — 78452 HT MUSCLE IMAGE SPECT MULT: CPT

## 2022-03-22 PROCEDURE — 93017 CV STRESS TEST TRACING ONLY: CPT

## 2022-03-22 NOTE — NM
EXAMINATION TYPE: NM stress lexiscan cardiolite

 

DATE OF EXAM: 3/22/2022

 

COMPARISON: NONE

 

HISTORY: Hypercholesteremia and family history of heart attack along with COPD and tobacco use along 
with hypertension and an prior personal heart attack 2018 presents with prior angioplasty.

 

TECHNIQUE:  After the intravenous administration of 10.17 mCi Tc 99m Sestamibi - Cardiolite resting S
PECT images acquired 45 minutes post injection. 

 

The patient received 0.4mg Lexiscan, 24.1 mCi Tc 99m Sestamibi - Stress images obtained 65 minutes po
st injection 

 

FINDINGS:  

 

Review of stress and rest SPECT images demonstrates decreased radiotracer uptake involving the latera
l wall on stress images versus rest images in short axis and horizontal long axis views however there
 is an artifact from adjacent uptake in the right ventricular wall. Exam is essentially nondiagnostic
 at evaluating this level. Gated analysis shows overall ejection fraction of greater than  70 %.

 

 

 

IMPRESSION:  Nondiagnostic evaluation of the lateral left ventricular wall extending towards the infe
rior wall.  No scintigraphic evidence for reversible ischemia otherwise. Need to further investigate 
for direct catheter angiogram should be based on degree of clinical suspicion.

## 2022-03-22 NOTE — EST
EXERCISE STRESS



AGE:

51



SEX:

F



HT:

5'5"



WT:

145 lbs.



PROTOCOL:

Lexiscan



STAGE:

NA



DURATION OF EXERCISE:



HEART RATE REST:

68



BLOOD PRESSURE REST:

129/102



MAXIMUM HEART RATE ACHIEVED:

108



MAXIMUM BLOOD PRESSURE:

140/91



85% MPHR:

143



100% MPHR:

168



METS:

NA



INDICATIONS:

Hypertension.



CLINICAL INFORMATION:

Baseline EKG shows sinus rhythm, normal axis, normal intervals.  Patient was given

intravenous Lexiscan as per protocol and did not have chest pain or diagnostic ST-

segment depression.



CONCLUSIONS:

1. Negative stress test by EKG criteria.

2. Cardiolite portion of the stress test will be reported separately.





MMODL / IJN: [id number] / Job#: [id number]

## 2025-06-23 ENCOUNTER — HOSPITAL ENCOUNTER (EMERGENCY)
Dept: HOSPITAL 47 - EC | Age: 55
Discharge: HOME | End: 2025-06-23
Payer: MEDICARE

## 2025-06-23 VITALS — DIASTOLIC BLOOD PRESSURE: 82 MMHG | HEART RATE: 82 BPM | SYSTOLIC BLOOD PRESSURE: 130 MMHG

## 2025-06-23 VITALS — TEMPERATURE: 97.9 F | RESPIRATION RATE: 18 BRPM

## 2025-06-23 DIAGNOSIS — L03.115: Primary | ICD-10-CM

## 2025-06-23 DIAGNOSIS — J44.9: ICD-10-CM

## 2025-06-23 DIAGNOSIS — F17.200: ICD-10-CM

## 2025-06-23 PROCEDURE — 99284 EMERGENCY DEPT VISIT MOD MDM: CPT

## 2025-06-23 RX ADMIN — ACETAMINOPHEN STA MG: 325 TABLET, FILM COATED ORAL at 15:39
